# Patient Record
Sex: MALE | Race: WHITE | HISPANIC OR LATINO | URBAN - METROPOLITAN AREA
[De-identification: names, ages, dates, MRNs, and addresses within clinical notes are randomized per-mention and may not be internally consistent; named-entity substitution may affect disease eponyms.]

---
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
## 2023-06-08 NOTE — PROCEDURE: MEDICATION COUNSELING
Detail Level: Zone Continue Regimen: Triamcinolone acetonide 0.1 % topical cream BID-Apply once daily to the affected areas as needed for itch/irritation Render In Strict Bullet Format?: No Oxybutynin Counseling:  I discussed with the patient the risks of oxybutynin including but not limited to skin rash, drowsiness, dry mouth, difficulty urinating, and blurred vision.